# Patient Record
Sex: FEMALE | Race: WHITE | NOT HISPANIC OR LATINO | Employment: FULL TIME | URBAN - METROPOLITAN AREA
[De-identification: names, ages, dates, MRNs, and addresses within clinical notes are randomized per-mention and may not be internally consistent; named-entity substitution may affect disease eponyms.]

---

## 2018-01-11 NOTE — MISCELLANEOUS
Provider Comments  Provider Comments:   No show for 5/19 appointment      Signatures   Electronically signed by : Joanne Santoro OM; May 20 2016  9:52AM EST                       (Author)    Electronically signed by : LINDA Tanner ; May 20 2016  9:53AM EST                       (Author)

## 2021-04-08 DIAGNOSIS — Z23 ENCOUNTER FOR IMMUNIZATION: ICD-10-CM

## 2023-07-26 ENCOUNTER — APPOINTMENT (OUTPATIENT)
Dept: LAB | Facility: HOSPITAL | Age: 43
End: 2023-07-26
Payer: COMMERCIAL

## 2023-07-26 DIAGNOSIS — Z79.899 ENCOUNTER FOR LONG-TERM (CURRENT) USE OF OTHER MEDICATIONS: ICD-10-CM

## 2023-07-26 DIAGNOSIS — L90.0 LICHEN SCLEROSUS: ICD-10-CM

## 2023-07-26 LAB
ATRIAL RATE: 60 BPM
P AXIS: 40 DEGREES
PR INTERVAL: 172 MS
QRS AXIS: 56 DEGREES
QRSD INTERVAL: 108 MS
QT INTERVAL: 416 MS
QTC INTERVAL: 416 MS
T WAVE AXIS: 31 DEGREES
VENTRICULAR RATE: 60 BPM

## 2023-07-26 PROCEDURE — 93005 ELECTROCARDIOGRAM TRACING: CPT

## 2023-07-26 PROCEDURE — 93010 ELECTROCARDIOGRAM REPORT: CPT | Performed by: INTERNAL MEDICINE

## 2024-07-27 ENCOUNTER — OFFICE VISIT (OUTPATIENT)
Dept: URGENT CARE | Facility: CLINIC | Age: 44
End: 2024-07-27
Payer: COMMERCIAL

## 2024-07-27 VITALS
HEIGHT: 64 IN | OXYGEN SATURATION: 99 % | WEIGHT: 209 LBS | RESPIRATION RATE: 18 BRPM | DIASTOLIC BLOOD PRESSURE: 76 MMHG | HEART RATE: 63 BPM | TEMPERATURE: 98.8 F | SYSTOLIC BLOOD PRESSURE: 124 MMHG | BODY MASS INDEX: 35.68 KG/M2

## 2024-07-27 DIAGNOSIS — H10.233 SEROUS CONJUNCTIVITIS OF BOTH EYES: Primary | ICD-10-CM

## 2024-07-27 PROCEDURE — G0382 LEV 3 HOSP TYPE B ED VISIT: HCPCS | Performed by: FAMILY MEDICINE

## 2024-07-27 RX ORDER — OFLOXACIN 3 MG/ML
1 SOLUTION/ DROPS OPHTHALMIC 4 TIMES DAILY
Qty: 5 ML | Refills: 0 | Status: SHIPPED | OUTPATIENT
Start: 2024-07-27

## 2024-07-27 RX ORDER — METHOTREXATE 2.5 MG/1
TABLET ORAL
COMMUNITY
Start: 2024-03-18

## 2024-07-27 RX ORDER — LEVOTHYROXINE SODIUM 0.15 MG/1
TABLET ORAL EVERY 24 HOURS
COMMUNITY

## 2024-07-27 RX ORDER — CALCIPOTRIENE AND BETAMETHASONE DIPROPIONATE 50; .5 UG/G; MG/G
AEROSOL, FOAM TOPICAL EVERY 24 HOURS
COMMUNITY

## 2024-07-27 RX ORDER — FOLIC ACID 1 MG/1
TABLET ORAL EVERY 24 HOURS
COMMUNITY

## 2024-07-27 NOTE — PROGRESS NOTES
[facility]        NAME: Ashleigh Rawls is a 43 y.o. female  : 1980    MRN: [id number]  DATE: 2024  TIME: 7:55 PM    Assessment and Plan   Serous conjunctivitis of both eyes [H10.233]  1. Serous conjunctivitis of both eyes  ofloxacin (OCUFLOX) 0.3 % ophthalmic solution            Patient Instructions       Follow up with PCP in 3-5 days.  Proceed to  ER if symptoms worsen.    If tests have been performed at [facility], our office will contact you with results if changes need to be made to the care plan discussed with you at the visit.  You can review your full results on [facility].    Chief Complaint     Chief Complaint   Patient presents with    Conjunctivitis     Pt  had red eyes a prescribed eye drops, used for 5 days from  to . Now today it came back worse redness in both eyes and watering.         History of Present Illness       43-year-old female presenting with bilateral eye redness, itchiness and watery discharge beginning since this morning.    Conjunctivitis   Associated symptoms include eye redness.       Review of Systems   Review of Systems   Constitutional: Negative.    HENT: Negative.     Eyes:  Positive for redness.   Respiratory: Negative.     Cardiovascular: Negative.    Gastrointestinal: Negative.    Genitourinary: Negative.    Skin: Negative.    Allergic/Immunologic: Negative.    Neurological: Negative.    Hematological: Negative.    Psychiatric/Behavioral: Negative.           Current Medications       Current Outpatient Medications:     methotrexate 2.5 MG tablet, 6 tablets Orally once a week, Disp: , Rfl:     ofloxacin (OCUFLOX) 0.3 % ophthalmic solution, Administer 1 drop into the left eye 4 (four) times a day, Disp: 5 mL, Rfl: 0    Calcipotriene-Betameth Diprop (Enstilar) 0.005-0.064 % FOAM, every 24 hours, Disp: , Rfl:     folic acid (FOLVITE) 1 mg tablet, every 24 hours, Disp: , Rfl:     levothyroxine 150 mcg tablet, every 24 hours, Disp: ,  "Rfl:     sertraline (ZOLOFT) 50 mg tablet, every 24 hours, Disp: , Rfl:     Current Allergies     Allergies as of 07/27/2024    (No Known Allergies)            The following portions of the patient's history were reviewed and updated as appropriate: allergies, current medications, past family history, past medical history, past social history, past surgical history and problem list.     No past medical history on file.    No past surgical history on file.    No family history on file.      Medications have been verified.        Objective   /76   Pulse 63   Temp 98.8 °F (37.1 °C) (Tympanic)   Resp 18   Ht 5' 3.5\" (1.613 m)   Wt 94.8 kg (209 lb)   SpO2 99%   BMI 36.44 kg/m²   No LMP recorded.       Physical Exam     Physical Exam  Vitals and nursing note reviewed.   Constitutional:       Appearance: She is well-developed.   HENT:      Head: Normocephalic.      Nose: Nose normal.   Eyes:      General:         Right eye: Discharge present.         Left eye: Discharge present.     Pupils: Pupils are equal, round, and reactive to light.   Cardiovascular:      Rate and Rhythm: Normal rate.   Pulmonary:      Effort: Pulmonary effort is normal.   Abdominal:      General: Abdomen is flat.   Musculoskeletal:         General: Normal range of motion.      Cervical back: Normal range of motion.   Skin:     General: Skin is warm and dry.   Neurological:      Mental Status: She is alert and oriented to person, place, and time.                   "

## 2024-08-26 PROBLEM — H10.233 SEROUS CONJUNCTIVITIS OF BOTH EYES: Status: RESOLVED | Noted: 2024-07-27 | Resolved: 2024-08-26

## 2025-05-06 ENCOUNTER — HOSPITAL ENCOUNTER (OUTPATIENT)
Dept: HOSPITAL 99 - WDC | Age: 45
End: 2025-05-06
Payer: COMMERCIAL

## 2025-05-06 DIAGNOSIS — Z12.31: Primary | ICD-10-CM

## 2025-08-11 ENCOUNTER — OFFICE VISIT (OUTPATIENT)
Age: 45
End: 2025-08-11
Payer: COMMERCIAL

## 2025-08-11 PROBLEM — E83.39 HYPOPHOSPHATEMIA: Status: ACTIVE | Noted: 2025-08-11

## 2025-08-11 PROBLEM — E06.3 HYPOTHYROIDISM DUE TO HASHIMOTO'S THYROIDITIS: Status: ACTIVE | Noted: 2025-08-11

## 2025-08-11 PROBLEM — E28.2 PCOS (POLYCYSTIC OVARIAN SYNDROME): Status: ACTIVE | Noted: 2025-08-11

## 2025-08-21 DIAGNOSIS — E66.01 MORBID OBESITY (HCC): Primary | ICD-10-CM

## 2025-08-21 RX ORDER — FLUCONAZOLE 150 MG/1
TABLET ORAL ONCE
COMMUNITY
Start: 2025-06-20

## 2025-08-21 RX ORDER — TIRZEPATIDE 2.5 MG/.5ML
2.5 INJECTION, SOLUTION SUBCUTANEOUS
Qty: 2 ML | Refills: 3 | Status: SHIPPED | OUTPATIENT
Start: 2025-08-21

## 2025-08-21 RX ORDER — CLOTRIMAZOLE AND BETAMETHASONE DIPROPIONATE 10; .64 MG/G; MG/G
CREAM TOPICAL 2 TIMES DAILY
COMMUNITY
Start: 2025-06-24